# Patient Record
Sex: FEMALE | Race: WHITE | Employment: FULL TIME | URBAN - METROPOLITAN AREA
[De-identification: names, ages, dates, MRNs, and addresses within clinical notes are randomized per-mention and may not be internally consistent; named-entity substitution may affect disease eponyms.]

---

## 2017-12-13 LAB
CHLAMYDIA, EXTERNAL: NEGATIVE
HBSAG, EXTERNAL: NEGATIVE
HIV, EXTERNAL: NON REACTIVE
N. GONORRHEA, EXTERNAL: NEGATIVE
RUBELLA, EXTERNAL: NORMAL

## 2018-04-27 LAB
ANTIBODY SCREEN, EXTERNAL: NEGATIVE
GTT, 1 HR, GLUCOLA, EXTERNAL: 102
T. PALLIDUM, EXTERNAL: NEGATIVE

## 2018-06-21 LAB — GRBS, EXTERNAL: NEGATIVE

## 2018-07-02 ENCOUNTER — ANESTHESIA (OUTPATIENT)
Dept: LABOR AND DELIVERY | Age: 33
End: 2018-07-02
Payer: COMMERCIAL

## 2018-07-02 ENCOUNTER — HOSPITAL ENCOUNTER (INPATIENT)
Age: 33
LOS: 2 days | Discharge: HOME | End: 2018-07-04
Attending: OBSTETRICS & GYNECOLOGY | Admitting: OBSTETRICS & GYNECOLOGY
Payer: COMMERCIAL

## 2018-07-02 ENCOUNTER — ANESTHESIA EVENT (OUTPATIENT)
Dept: LABOR AND DELIVERY | Age: 33
End: 2018-07-02
Payer: COMMERCIAL

## 2018-07-02 DIAGNOSIS — G89.18 POST-OP PAIN: Primary | ICD-10-CM

## 2018-07-02 PROBLEM — Z34.90 PREGNANCY: Status: ACTIVE | Noted: 2018-07-02

## 2018-07-02 LAB
ALBUMIN SERPL-MCNC: 2.6 G/DL (ref 3.5–5)
ALBUMIN/GLOB SERPL: 0.9 {RATIO} (ref 1.1–2.2)
ALP SERPL-CCNC: 109 U/L (ref 45–117)
ALT SERPL-CCNC: 14 U/L (ref 12–78)
ANION GAP SERPL CALC-SCNC: 4 MMOL/L (ref 5–15)
AST SERPL-CCNC: 17 U/L (ref 15–37)
BILIRUB SERPL-MCNC: 0.2 MG/DL (ref 0.2–1)
BUN SERPL-MCNC: 8 MG/DL (ref 6–20)
BUN/CREAT SERPL: 11 (ref 12–20)
CALCIUM SERPL-MCNC: 8.5 MG/DL (ref 8.5–10.1)
CHLORIDE SERPL-SCNC: 105 MMOL/L (ref 97–108)
CO2 SERPL-SCNC: 24 MMOL/L (ref 21–32)
CREAT SERPL-MCNC: 0.7 MG/DL (ref 0.55–1.02)
CREAT UR-MCNC: 209.19 MG/DL
ERYTHROCYTE [DISTWIDTH] IN BLOOD BY AUTOMATED COUNT: 13.3 % (ref 11.5–14.5)
GLOBULIN SER CALC-MCNC: 3 G/DL (ref 2–4)
GLUCOSE SERPL-MCNC: 76 MG/DL (ref 65–100)
HCT VFR BLD AUTO: 32.2 % (ref 35–47)
HGB BLD-MCNC: 10.7 G/DL (ref 11.5–16)
LDH SERPL L TO P-CCNC: 147 U/L (ref 81–246)
MCH RBC QN AUTO: 29.8 PG (ref 26–34)
MCHC RBC AUTO-ENTMCNC: 33.2 G/DL (ref 30–36.5)
MCV RBC AUTO: 89.7 FL (ref 80–99)
NRBC # BLD: 0 K/UL (ref 0–0.01)
NRBC BLD-RTO: 0 PER 100 WBC
PLATELET # BLD AUTO: 208 K/UL (ref 150–400)
PMV BLD AUTO: 11.9 FL (ref 8.9–12.9)
POTASSIUM SERPL-SCNC: 4.2 MMOL/L (ref 3.5–5.1)
PROT SERPL-MCNC: 5.6 G/DL (ref 6.4–8.2)
PROT UR-MCNC: 50 MG/DL (ref 0–11.9)
PROT/CREAT UR-RTO: 0.2
RBC # BLD AUTO: 3.59 M/UL (ref 3.8–5.2)
SODIUM SERPL-SCNC: 133 MMOL/L (ref 136–145)
URATE SERPL-MCNC: 4.9 MG/DL (ref 2.6–6)
WBC # BLD AUTO: 8.8 K/UL (ref 3.6–11)

## 2018-07-02 PROCEDURE — 76060000078 HC EPIDURAL ANESTHESIA: Performed by: OBSTETRICS & GYNECOLOGY

## 2018-07-02 PROCEDURE — 74011250636 HC RX REV CODE- 250/636

## 2018-07-02 PROCEDURE — 82570 ASSAY OF URINE CREATININE: CPT | Performed by: OBSTETRICS & GYNECOLOGY

## 2018-07-02 PROCEDURE — 74011250636 HC RX REV CODE- 250/636: Performed by: ANESTHESIOLOGY

## 2018-07-02 PROCEDURE — 74011000258 HC RX REV CODE- 258: Performed by: OBSTETRICS & GYNECOLOGY

## 2018-07-02 PROCEDURE — 80053 COMPREHEN METABOLIC PANEL: CPT | Performed by: OBSTETRICS & GYNECOLOGY

## 2018-07-02 PROCEDURE — 59025 FETAL NON-STRESS TEST: CPT

## 2018-07-02 PROCEDURE — 83615 LACTATE (LD) (LDH) ENZYME: CPT | Performed by: OBSTETRICS & GYNECOLOGY

## 2018-07-02 PROCEDURE — 74011250636 HC RX REV CODE- 250/636: Performed by: OBSTETRICS & GYNECOLOGY

## 2018-07-02 PROCEDURE — 99284 EMERGENCY DEPT VISIT MOD MDM: CPT

## 2018-07-02 PROCEDURE — 84550 ASSAY OF BLOOD/URIC ACID: CPT | Performed by: OBSTETRICS & GYNECOLOGY

## 2018-07-02 PROCEDURE — 77030011640 HC PAD GRND REM COVD -A

## 2018-07-02 PROCEDURE — 75410000002 HC LABOR FEE PER 1 HR: Performed by: OBSTETRICS & GYNECOLOGY

## 2018-07-02 PROCEDURE — 77030034850

## 2018-07-02 PROCEDURE — 77030008459 HC STPLR SKN COOP -B

## 2018-07-02 PROCEDURE — 77030018809 HC RETRCTR ALXSO DISP AMR -B

## 2018-07-02 PROCEDURE — 74011000250 HC RX REV CODE- 250

## 2018-07-02 PROCEDURE — 85027 COMPLETE CBC AUTOMATED: CPT | Performed by: OBSTETRICS & GYNECOLOGY

## 2018-07-02 PROCEDURE — 77030018836 HC SOL IRR NACL ICUM -A

## 2018-07-02 PROCEDURE — 75410000003 HC RECOV DEL/VAG/CSECN EA 0.5 HR: Performed by: OBSTETRICS & GYNECOLOGY

## 2018-07-02 PROCEDURE — 86901 BLOOD TYPING SEROLOGIC RH(D): CPT | Performed by: OBSTETRICS & GYNECOLOGY

## 2018-07-02 PROCEDURE — 77030032490 HC SLV COMPR SCD KNE COVD -B

## 2018-07-02 PROCEDURE — 76010000391 HC C SECN FIRST 1 HR: Performed by: OBSTETRICS & GYNECOLOGY

## 2018-07-02 PROCEDURE — 36415 COLL VENOUS BLD VENIPUNCTURE: CPT | Performed by: OBSTETRICS & GYNECOLOGY

## 2018-07-02 PROCEDURE — 65270000029 HC RM PRIVATE

## 2018-07-02 RX ORDER — NALOXONE HYDROCHLORIDE 0.4 MG/ML
0.4 INJECTION, SOLUTION INTRAMUSCULAR; INTRAVENOUS; SUBCUTANEOUS AS NEEDED
Status: DISCONTINUED | OUTPATIENT
Start: 2018-07-02 | End: 2018-07-04 | Stop reason: HOSPADM

## 2018-07-02 RX ORDER — SODIUM CHLORIDE, SODIUM LACTATE, POTASSIUM CHLORIDE, CALCIUM CHLORIDE 600; 310; 30; 20 MG/100ML; MG/100ML; MG/100ML; MG/100ML
999 INJECTION, SOLUTION INTRAVENOUS CONTINUOUS
Status: DISCONTINUED | OUTPATIENT
Start: 2018-07-02 | End: 2018-07-02

## 2018-07-02 RX ORDER — ONDANSETRON 2 MG/ML
INJECTION INTRAMUSCULAR; INTRAVENOUS
Status: COMPLETED
Start: 2018-07-02 | End: 2018-07-02

## 2018-07-02 RX ORDER — FENTANYL CITRATE 50 UG/ML
INJECTION, SOLUTION INTRAMUSCULAR; INTRAVENOUS AS NEEDED
Status: DISCONTINUED | OUTPATIENT
Start: 2018-07-02 | End: 2018-07-02 | Stop reason: HOSPADM

## 2018-07-02 RX ORDER — NALOXONE HYDROCHLORIDE 0.4 MG/ML
0.2 INJECTION, SOLUTION INTRAMUSCULAR; INTRAVENOUS; SUBCUTANEOUS
Status: ACTIVE | OUTPATIENT
Start: 2018-07-02 | End: 2018-07-03

## 2018-07-02 RX ORDER — ACETAMINOPHEN 325 MG/1
650 TABLET ORAL
COMMUNITY
End: 2018-07-04

## 2018-07-02 RX ORDER — LABETALOL 100 MG/1
100 TABLET, FILM COATED ORAL
Status: DISCONTINUED | OUTPATIENT
Start: 2018-07-02 | End: 2018-07-04 | Stop reason: HOSPADM

## 2018-07-02 RX ORDER — OXYTOCIN 10 [USP'U]/ML
INJECTION, SOLUTION INTRAMUSCULAR; INTRAVENOUS AS NEEDED
Status: DISCONTINUED | OUTPATIENT
Start: 2018-07-02 | End: 2018-07-02 | Stop reason: HOSPADM

## 2018-07-02 RX ORDER — PROCHLORPERAZINE EDISYLATE 5 MG/ML
2.5 INJECTION INTRAMUSCULAR; INTRAVENOUS
Status: ACTIVE | OUTPATIENT
Start: 2018-07-02 | End: 2018-07-03

## 2018-07-02 RX ORDER — SODIUM CHLORIDE, SODIUM LACTATE, POTASSIUM CHLORIDE, CALCIUM CHLORIDE 600; 310; 30; 20 MG/100ML; MG/100ML; MG/100ML; MG/100ML
125 INJECTION, SOLUTION INTRAVENOUS CONTINUOUS
Status: DISCONTINUED | OUTPATIENT
Start: 2018-07-02 | End: 2018-07-03

## 2018-07-02 RX ORDER — OXYCODONE HYDROCHLORIDE 5 MG/1
5 TABLET ORAL
Status: ACTIVE | OUTPATIENT
Start: 2018-07-02 | End: 2018-07-03

## 2018-07-02 RX ORDER — OXYCODONE AND ACETAMINOPHEN 5; 325 MG/1; MG/1
1 TABLET ORAL
Status: DISCONTINUED | OUTPATIENT
Start: 2018-07-02 | End: 2018-07-04 | Stop reason: HOSPADM

## 2018-07-02 RX ORDER — OXYCODONE HYDROCHLORIDE 5 MG/1
10 TABLET ORAL
Status: ACTIVE | OUTPATIENT
Start: 2018-07-02 | End: 2018-07-03

## 2018-07-02 RX ORDER — ONDANSETRON 2 MG/ML
INJECTION INTRAMUSCULAR; INTRAVENOUS AS NEEDED
Status: DISCONTINUED | OUTPATIENT
Start: 2018-07-02 | End: 2018-07-02 | Stop reason: HOSPADM

## 2018-07-02 RX ORDER — SODIUM CHLORIDE 0.9 % (FLUSH) 0.9 %
5-10 SYRINGE (ML) INJECTION EVERY 8 HOURS
Status: DISCONTINUED | OUTPATIENT
Start: 2018-07-02 | End: 2018-07-03

## 2018-07-02 RX ORDER — OXYTOCIN/RINGER'S LACTATE 20/1000 ML
125-500 PLASTIC BAG, INJECTION (ML) INTRAVENOUS ONCE
Status: ACTIVE | OUTPATIENT
Start: 2018-07-02 | End: 2018-07-03

## 2018-07-02 RX ORDER — SODIUM CHLORIDE 0.9 % (FLUSH) 0.9 %
5-10 SYRINGE (ML) INJECTION EVERY 8 HOURS
Status: DISCONTINUED | OUTPATIENT
Start: 2018-07-02 | End: 2018-07-02 | Stop reason: HOSPADM

## 2018-07-02 RX ORDER — ZOLPIDEM TARTRATE 5 MG/1
5 TABLET ORAL
Status: DISCONTINUED | OUTPATIENT
Start: 2018-07-02 | End: 2018-07-04 | Stop reason: HOSPADM

## 2018-07-02 RX ORDER — ONDANSETRON 2 MG/ML
4 INJECTION INTRAMUSCULAR; INTRAVENOUS
Status: DISCONTINUED | OUTPATIENT
Start: 2018-07-02 | End: 2018-07-04 | Stop reason: HOSPADM

## 2018-07-02 RX ORDER — CEFAZOLIN SODIUM/WATER 2 G/20 ML
2 SYRINGE (ML) INTRAVENOUS ONCE
Status: COMPLETED | OUTPATIENT
Start: 2018-07-02 | End: 2018-07-02

## 2018-07-02 RX ORDER — BUPIVACAINE HYDROCHLORIDE 7.5 MG/ML
INJECTION, SOLUTION EPIDURAL; RETROBULBAR AS NEEDED
Status: DISCONTINUED | OUTPATIENT
Start: 2018-07-02 | End: 2018-07-02 | Stop reason: HOSPADM

## 2018-07-02 RX ORDER — SODIUM CHLORIDE 0.9 % (FLUSH) 0.9 %
5-10 SYRINGE (ML) INJECTION AS NEEDED
Status: DISCONTINUED | OUTPATIENT
Start: 2018-07-02 | End: 2018-07-02 | Stop reason: HOSPADM

## 2018-07-02 RX ORDER — IBUPROFEN 800 MG/1
800 TABLET ORAL EVERY 8 HOURS
Status: DISCONTINUED | OUTPATIENT
Start: 2018-07-02 | End: 2018-07-04 | Stop reason: HOSPADM

## 2018-07-02 RX ORDER — KETOROLAC TROMETHAMINE 30 MG/ML
30 INJECTION, SOLUTION INTRAMUSCULAR; INTRAVENOUS
Status: DISPENSED | OUTPATIENT
Start: 2018-07-02 | End: 2018-07-03

## 2018-07-02 RX ORDER — HYDROMORPHONE HYDROCHLORIDE 2 MG/ML
1 INJECTION, SOLUTION INTRAMUSCULAR; INTRAVENOUS; SUBCUTANEOUS ONCE
Status: COMPLETED | OUTPATIENT
Start: 2018-07-02 | End: 2018-07-02

## 2018-07-02 RX ORDER — SODIUM CHLORIDE 0.9 % (FLUSH) 0.9 %
5-10 SYRINGE (ML) INJECTION AS NEEDED
Status: DISCONTINUED | OUTPATIENT
Start: 2018-07-02 | End: 2018-07-04 | Stop reason: HOSPADM

## 2018-07-02 RX ORDER — SIMETHICONE 80 MG
80 TABLET,CHEWABLE ORAL AS NEEDED
Status: DISCONTINUED | OUTPATIENT
Start: 2018-07-02 | End: 2018-07-04 | Stop reason: HOSPADM

## 2018-07-02 RX ORDER — SODIUM CHLORIDE, SODIUM LACTATE, POTASSIUM CHLORIDE, CALCIUM CHLORIDE 600; 310; 30; 20 MG/100ML; MG/100ML; MG/100ML; MG/100ML
1000 INJECTION, SOLUTION INTRAVENOUS CONTINUOUS
Status: DISCONTINUED | OUTPATIENT
Start: 2018-07-02 | End: 2018-07-02 | Stop reason: HOSPADM

## 2018-07-02 RX ORDER — MORPHINE SULFATE 0.5 MG/ML
INJECTION, SOLUTION EPIDURAL; INTRATHECAL; INTRAVENOUS AS NEEDED
Status: DISCONTINUED | OUTPATIENT
Start: 2018-07-02 | End: 2018-07-02 | Stop reason: HOSPADM

## 2018-07-02 RX ADMIN — SODIUM CHLORIDE, SODIUM LACTATE, POTASSIUM CHLORIDE, AND CALCIUM CHLORIDE 125 ML/HR: 600; 310; 30; 20 INJECTION, SOLUTION INTRAVENOUS at 19:36

## 2018-07-02 RX ADMIN — ONDANSETRON 4 MG: 2 INJECTION INTRAMUSCULAR; INTRAVENOUS at 19:36

## 2018-07-02 RX ADMIN — OXYTOCIN 30 UNITS: 10 INJECTION, SOLUTION INTRAMUSCULAR; INTRAVENOUS at 16:45

## 2018-07-02 RX ADMIN — SODIUM CHLORIDE, SODIUM LACTATE, POTASSIUM CHLORIDE, AND CALCIUM CHLORIDE 999 ML/HR: 600; 310; 30; 20 INJECTION, SOLUTION INTRAVENOUS at 16:13

## 2018-07-02 RX ADMIN — SODIUM CHLORIDE, SODIUM LACTATE, POTASSIUM CHLORIDE, AND CALCIUM CHLORIDE 999 ML/HR: 600; 310; 30; 20 INJECTION, SOLUTION INTRAVENOUS at 15:21

## 2018-07-02 RX ADMIN — BUPIVACAINE HYDROCHLORIDE 1.6 ML: 7.5 INJECTION, SOLUTION EPIDURAL; RETROBULBAR at 16:30

## 2018-07-02 RX ADMIN — FENTANYL CITRATE 25 MCG: 50 INJECTION, SOLUTION INTRAMUSCULAR; INTRAVENOUS at 16:30

## 2018-07-02 RX ADMIN — HYDROMORPHONE HYDROCHLORIDE 0.5 MG: 2 INJECTION INTRAMUSCULAR; INTRAVENOUS; SUBCUTANEOUS at 18:47

## 2018-07-02 RX ADMIN — ONDANSETRON 4 MG: 2 INJECTION, SOLUTION INTRAMUSCULAR; INTRAVENOUS at 19:36

## 2018-07-02 RX ADMIN — MORPHINE SULFATE 200 MCG: 0.5 INJECTION, SOLUTION EPIDURAL; INTRATHECAL; INTRAVENOUS at 16:30

## 2018-07-02 RX ADMIN — PROMETHAZINE HYDROCHLORIDE 12.5 MG: 25 INJECTION INTRAMUSCULAR; INTRAVENOUS at 21:44

## 2018-07-02 RX ADMIN — SODIUM CHLORIDE, SODIUM LACTATE, POTASSIUM CHLORIDE, AND CALCIUM CHLORIDE 1000 ML: 600; 310; 30; 20 INJECTION, SOLUTION INTRAVENOUS at 15:21

## 2018-07-02 RX ADMIN — ONDANSETRON 4 MG: 2 INJECTION INTRAMUSCULAR; INTRAVENOUS at 16:45

## 2018-07-02 RX ADMIN — Medication 2 G: at 16:13

## 2018-07-02 NOTE — IP AVS SNAPSHOT
Bethanie Mayen 
 
 
 [address] [facility] 
[phone] Patient: Carlito Colindres MRN: [id number] :1985 About your hospitalization You were admitted on:  2018 You last received care in the:  [facility] You were discharged on:  2018 Why you were hospitalized Your primary diagnosis was:  Not on File Your diagnoses also included:  Pregnancy, Post-Op Pain Follow-up Information Follow up With Details Comments Contact Info Hany Mancera MD   [address] [address] [place] [address] 
[phone] Discharge Orders None A check michele indicates which time of day the medication should be taken. My Medications CHANGE how you take these medications Instructions Each Dose to Equal  
 Morning Noon Evening Bedtime  
 ibuprofen 600 mg tablet Commonly known as:  MOTRIN What changed:   
- medication strength 
- how much to take - when to take this Your last dose was: Your next dose is: Take 1 Tab by mouth every six (6) hours as needed for Pain for up to 360 days. 600 mg  
    
   
   
   
  
 oxyCODONE-acetaminophen 5-325 mg per tablet Commonly known as:  PERCOCET What changed:   
- how much to take 
- reasons to take this Your last dose was: Your next dose is: Take 1-2 Tabs by mouth every four (4) hours as needed for Pain. Max Daily Amount: 12 Tabs. 1-2 Tab CONTINUE taking these medications Instructions Each Dose to Equal  
 Morning Noon Evening Bedtime  
 [id number] jacinta-folic-dha -155 mg-mcg-mg Cmpk Your last dose was: Your next dose is: Take 1 Tab by mouth daily. Indications: PREGNANCY  
 1 Tab STOP taking these medications TYLENOL 325 mg tablet Generic drug:  acetaminophen Where to Get Your Medications Information on where to get these meds will be given to you by the nurse or doctor. ! Ask your nurse or doctor about these medications  
  ibuprofen 600 mg tablet  
 oxyCODONE-acetaminophen 5-325 mg per tablet Opioid Education Prescription Opioids: What You Need to Know: 
 
 
Diet/Diet Restrictions: 
· Eight 8-ounce glasses of fluid daily (water, juices); avoid excessive caffeine intake. · Meals/snacks as desired which are high in fiber and carbohydrates and low in fat and cholesterol. Medications:  
 
 
 
Physical Activity / Restrictions / Safety: · Avoid heavy lifting, no more that 8 lbs. For 2-3 weeks;  
· Limit use of stairs to 2 times daily for the first week home. · No driving for one week. · Avoid intercourse 4-6 weeks, no douching or tampon use. · Check with obstetrician before starting or resuming an exercise program.   
 
Discharge Instructions/Special Treatment/Home Care Needs:  
 
· Continue prenatal vitamins. · Continue to use squirt bottle with warm water on your episiotomy after each bathroom use until bleeding stops. · If steri-strips applied to your incision, remove in 7-10 days. Call your doctor for the following: · Fever over 101 degrees by mouth. · Vaginal bleeding heavier than a normal menstrual period or clots larger than a golf ball. · Red streaks or increased swelling of legs, painful red streaks on your breast. 
· Painful urination, constipation and increased pain or swelling or discharge with your incision. · If you feel extremely anxious or overwhelmed. · If you have thoughts of harming yourself and/or your baby. Pain Management:  
 
· Take Acetaminophen (Tylenol) or Ibuprofen (Advil, Motrin), as directed for pain. · Use a warm Sitz bath 3 times daily to relieve episiotomy or hemorrhoidal discomfort. · For hemorrhoidal discomfort, use Tucks and Anusol cream as needed and directed. · Heating pad to  incision as needed. Follow-Up Care:  
 
Appointment with MD: Follow-up Appointments Procedures  FOLLOW UP VISIT Appointment in: 6 Weeks With Dr Tracy Richards for postpartum check and in 2 days for BP check With Dr Tracy Richards for postpartum check and in 2 days for BP check Standing Status:   Standing Number of Occurrences:   1 Order Specific Question:   Appointment in Answer:   6 Weeks Telephone number: [phone] Signed By: Janis Cervantes MD                                                                                                   Date: 2018 Time: 9:42 AM 
 
  
  
  
Avatar Reality Announcement We are excited to announce that we are making your provider's discharge notes available to you in Avatar Reality. You will see these notes when they are completed and signed by the physician that discharged you from your recent hospital stay. If you have any questions or concerns about any information you see in Avatar Reality, please call the Health Information Department where you were seen or reach out to your Primary Care Provider for more information about your plan of care. Introducing [facility]! Keren Kaplan introduces Avatar Reality patient portal. Now you can access parts of your medical record, email your doctor's office, and request medication refills online. 1. In your internet browser, go to [email or web address] 2. Click on the First Time User? Click Here link in the Sign In box. You will see the New Member Sign Up page. 3. Enter your Sanrad Access Code exactly as it appears below. You will not need to use this code after youve completed the sign-up process. If you do not sign up before the expiration date, you must request a new code. · Sanrad Access Code: [email or web address] Expires: 10/2/2018 10:39 AM 
 
4. Enter the last four digits of your Social Security Number (xxxx) and Date of Birth (mm/dd/yyyy) as indicated and click Submit. You will be taken to the next sign-up page. 5. Create a "[employer]t ID. This will be your Sanrad login ID and cannot be changed, so think of one that is secure and easy to remember. 6. Create a Sanrad password. You can change your password at any time. 7. Enter your Password Reset Question and Answer. This can be used at a later time if you forget your password. 8. Enter your e-mail address. You will receive e-mail notification when new information is available in [address] 9. Click Sign Up. You can now view and download portions of your medical record. 10. Click the Download Summary menu link to download a portable copy of your medical information. If you have questions, please visit the Frequently Asked Questions section of the Sanrad website. Remember, Sanrad is NOT to be used for urgent needs. For medical emergencies, dial 911. Now available from your iPhone and Android! Introducing Topher Tolentino As a [facility] patient, I wanted to make you aware of our electronic visit tool called Topher Arseniomillykareem. [facility] 24/7 allows you to connect within minutes with a medical provider 24 hours a day, seven days a week via a mobile device or tablet or logging into a secure website from your computer. You can access Topher Tolentino from anywhere in the [place].  
 
A virtual visit might be right for you when you have a simple condition and feel like you just dont want to get out of bed, or cant get away from work for an appointment, when your [facility] provider is not available (evenings, weekends or holidays), or when youre out of town and need minor care. Electronic visits cost only $49 and if the [facility] 24/7 provider determines a prescription is needed to treat your condition, one can be electronically transmitted to a nearby pharmacy*. Please take a moment to enroll today if you have not already done so. The enrollment process is free and takes just a few minutes. To enroll, please download the [employer] 24/7 day to your tablet or phone, or visit [email or web address] to enroll on your computer. And, as an [address] [place] [address] patient with a [employer] account, the results of your visits will be scanned into your electronic medical record and your primary care provider will be able to view the scanned results. We urge you to continue to see your regular [facility] provider for your ongoing medical care. And while your primary care provider may not be the one available when you seek a Topher vitaMedMDmillyfin virtual visit, the peace of mind you get from getting a real diagnosis real time can be priceless. For more information on Inside, view our Frequently Asked Questions (FAQs) at [email or web address]. Sincerely, 
 
Coty Heath MD 
Chief Medical Officer [facility] Genie Carreon *:  certain medications cannot be prescribed via [employer] Providers Seen During Your Hospitalization Provider Specialty Primary office phone Silvana Mcdaniel MD Obstetrics & Gynecology [phone] Your Primary Care Physician (PCP) Primary Care Physician Office Phone Office Fax Pérez Varela [phone] You are allergic to the following No active allergies Recent Documentation Height Weight Breastfeeding? BMI OB Status Smoking Status 1.626 m 103.4 kg Unknown 39.14 kg/m2 Recent pregnancy Never Smoker Emergency Contacts Name Discharge Info Relation Home Work Mobile Hugh Pizano DISCHARGE CAREGIVER [3] Spouse [3]   [phone] Patient Belongings The following personal items are in your possession at time of discharge: 
  Dental Appliances: None  Visual Aid: With patient, Contacts      Home Medications: None   Jewelry: Earrings, Ring, With patient  Clothing: At bedside    Other Valuables: None Please provide this summary of care documentation to your next provider. Signatures-by signing, you are acknowledging that this After Visit Summary has been reviewed with you and you have received a copy. Patient Signature:  ____________________________________________________________ Date:  ____________________________________________________________  
  
Donte Reji Provider Signature:  ____________________________________________________________ Date:  ____________________________________________________________

## 2018-07-02 NOTE — ROUTINE PROCESS
1350: Patient admitted to labor and delivery for Pre-E rule out. Pt oriented to room and unit, pt changed into gown. PT placed on FHR and toco monitor, assessment completed. Plan of care discussed with pt, pt verbalizing understanding    [phone]: This RN calling Dr. Rosalie Tran to update MD on pt status and bp readings, this RN waiting for MD to return call at this time    [phone]: This RN updating Dr. Rosalie Tran over phone on pt status, bp readings, and lab values, MD calling c/s at this time and TORB start IV fluids at this time and 2 gram ancef for surgical prophylaxis. MD stating he is available for c/s when fluids are finished     1517: Dr. Rosalie Tran at pt bedside at this time discussing plan of care with pt, pt verbalizing understanding. 1522: SVE per Dr. Rosalie Tran, unchanged cervix noted FT/50/-4 noted. MD discussing plan of care with pt, pt verbalizing understanding    1525: This RN informing Dr. Raul Morales that pt has eaten peanut butter crackers today at 6, MD stating she is still fine to go back for c/s in approximately 1 hour    1615: Pt transferring from room 216 to OR 1 at this time via ambulation with this RN and pt spouse    [id number]: Pt in OR 1 at this time    1628: FHR noted in 140's and maternal heart rate noted at 85 at this time    1716: Pt transferring from OR 1 to room 216 at this time via stretcher with this RN, [facility], and pt spouse at this time    26: Pt in room 216 at this time    1719: VERA Lui leaving pt bedside at this time    1807: This RN discussing pt plan of care with Dr. Jose J Gurrola at this time. This RN updating MD on bp readings to determine plan of care for pain control. MD SANDOVAL hold off on Toradol at this time and administer dilaudid instead    1833: Dr. Josafat tee to administer 1mg dilaudid at this time for pt pain    1905: Bedside, Verbal and Written shift change report given to BUD Santamaria RN (oncoming nurse) by Nancy Wren RN (offgoing nurse).  Report included the following information SBAR, Kardex, OR Summary, Intake/Output, MAR, Accordion and Recent Results.

## 2018-07-02 NOTE — ANESTHESIA PREPROCEDURE EVALUATION
Anesthetic History   No history of anesthetic complications            Review of Systems / Medical History  Patient summary reviewed, nursing notes reviewed and pertinent labs reviewed    Pulmonary            Asthma (well controlled)        Neuro/Psych   Within defined limits           Cardiovascular    Hypertension                   GI/Hepatic/Renal  Within defined limits              Endo/Other  Within defined limits           Other Findings              Physical Exam    Airway  Mallampati: II  TM Distance: 4 - 6 cm  Neck ROM: normal range of motion   Mouth opening: Normal     Cardiovascular    Rhythm: regular  Rate: normal         Dental  No notable dental hx       Pulmonary  Breath sounds clear to auscultation               Abdominal         Other Findings            Anesthetic Plan    ASA: 2  Anesthesia type: spinal            Anesthetic plan and risks discussed with: Patient

## 2018-07-02 NOTE — H&P
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 Medications Removed from Medication List        [address] for Follow-up Visit     Estimated weeks of        gestation:  37 3     Weight:  228.4     Blood pressure: 150 / 100     Urine Protein:  Tr     Urine Glucose: N     Headache:  No     Nausea/vomiting: No     Edema:  1+gen     Vaginal bleeding: no     Vaginal discharge: no     Fetal activity:  yes     Fundal height:    39     FHR:   138/US     Labor symptoms: bhc     Fetal position:  cephalic     Next visit:  to L&D     Preceptor:  ss     Comment:  Active FM. No complaints. to L&D for r/o PreE. Fetal Surveillance  Biophysical Profile Fetus A  Breathing  2  Movement  2  Tone   2  AFV   2     ___  BPP Score  8/8    NST Fetus A  An NST was performed and was reactive. The baseline FHR was 145 and regular. Moderate variablity was present. Three accelerations of sufficient amplitude and duration were noted. There were no decelerations noted.        Impression & Recommendations:    Problem # 1:  Transient HTN in pregnancy (MILA-244.76) (YWM13-J23.3)  To L&D for r/o PreE  Pre E labs  NST  If persistently elevated (GHTN vs PreE) given term would plan for delivery and pt desires c/s if not dilated significantly at this time    Orders:  Fetal non-stress test (CPT-86232O)  Antepartum Care (CPT-10)  Patient Sent to L&D (CPT-LD)  Sent to L&D  (CPT-AdmitF)      Problem # 2: HSV-Valtrex @ 36 wks____ (FPS-149.35) (QZR98-W61.364)  Taking valtrex    Problem # 3:  Prev LTCS desires  consent 18 (ETH-434.23) (WLJ75-E15.211)      Medications (at conclusion of this visit)    2017 PNV TABS 29-1 TABLET (PRENATAL VIT-IRON CARBONYL-FA TABS)                     Review of Systems        See HPI    Except as noted in the HPI, the review of systems is negative for General, Breast, , CV, Resp, GI, Endo, MS, Derm, Neuro, Psych, Eyes, ENT, Allergy and Heme. Vital Signs    Blood Pressure: 150 / 100  NST:   reactive     Weight:  228.4 pounds      Physical Exam     General           General appearance:  no acute distress    Head           Inspection:   normal    Eyes           External:   EOM intact    ENT           Dental:   adequate dentition    Chest           Lungs:  clear to auscultation          Heart:  regular rate and rhythm    Extremeties           Extremeties:  0 edema    Neurological           Reflexes:  2+ and symmetric with no pathological reflexes    Psych           Orientation:  oriented to time, place, and person          Mood:  no appearance of anxiety, depression, or agitation    Lymph           Inguinal:  no inguinal adenopathy    Skin           Inspection:  no rashes, suspicious lesions, or ulcerations    Abdomen           Abdomen:  gravid          Fundal Height:  39    Pelvic Exam           EGBUS:  no lesions          Vagina:  normal appearing without lesions or discharge          Uterus:  gravid          Cervix:  no lesions or discharge                  Presentation:  cephalic    Allergies    This patient has no known allergies.     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____ (VZY-531.33) (EPL30-N10.754)  Taking valtrex    Problem # 3:  Prev LTCS desires  consent 18 (FRB-701.61) (HVT94-E07.211)      Medications (at conclusion of this visit)    2017 PNV TABS 29-1 TABLET (PRENATAL VIT-IRON CARBONYL-FA TABS)           LABORATORY DATA   TEST DATE RESULT   Group B Strep culture 2018 Negative                                   (Group B Strep Culture Result Field)   Blood Type 2017 A                                             (Blood Type Result Field)   Rh 2017 Positive                                   (Rh Result Field)   Rhogam Inj Given 2015 *   Tdap Vaccine Given 2018 Vacc. [address]   Antibody Screen 2018 Negative   Rubella  [facility] Reference Ranges On or After 3/10/14                  <0.90              Non-immune      0.90 - 0.99     Equivocal      >0.99              Immune    [facility] Reference Ranges  Before 3/10/14           <5                 Non-immune             5 - 9               Equivocal            >9                 Immune  [facility] Reference Ranges       < Or = 0.90       Negative             0.91-1.09          Equivocal            > Or = 1.10       Positive   2017     1.98     TPA (T Pallidum Antibodies) 2018 Negative   Serology (RPR) 2015 *   HBsAg 2017 Negative   HIV 2017 Non Reactive   Hemoglobin 2018 11.3   Hematocrit 2018 34.6   Platelets  220 X10E3/UL   TSH 10/09/2017 2.210   Urine Culture 2017 Negative   GC DNA Probe 2017 Negative   Chlamydia DNA 2017 Negative   PAP 2017 NIL   Flu Vaccine Given 2018 Vacc.  [address]   HGBA1C 2015 *   HGB Electro     T4, Free 2015 *   BG Fasting 2015 *   GTT 1H 50G 2018 102   GTT 1H 100G 2015 *   GTT 2H 100G 12/21/2015 *   GTT 3H 100G 12/21/2015 *   Glucose Plasma 12/21/2015 *   CF Accept or Decline 12/13/2017 declined   CF Screen Result 05/15/2015 Negative   Nuchal Trans 01/08/2018 1.7^1.7 mm&millimeters   AFP Only 02/05/2018 *Screen Negative*   Tetra     AFP Serum 12/21/2015 *   CVS 12/13/2017 declined   AFP Amniotic 12/21/2015 *   Amnio Karyo 12/13/2017 declined   FISH 12/21/2015 *   GC Culture 12/21/2015 *   Chlamydia Cult 12/21/2015 *   Ureaplasma     Mycoplasma     WBC 06/29/2018 8.8 X10E3/UL   RBC 06/29/2018 3.84 X10E6/UL   MCV 06/29/2018 90   MCH 06/29/2018 29.4   MCHC RBC 06/29/2018 32.7     ULTRASOUND DATA   TEST DATE RESULT   Estimated Fetal Weight 04/26/2018 0678.04105972^4200 g&grams                                     Weight % 04/26/2018 86^86% %&percent                                                MASSIEL 04/26/2018 20.14^20.1 cm&centimeters                    BPP 04/26/2018 8   Cervical Length (mm)       ]      Electronically signed by Anna Wisdom MD on 07/02/2018 at 12:45 PM    ________________________________________________________________________

## 2018-07-02 NOTE — PROGRESS NOTES
Patient seen for evaluation following admission  Blood pressures remain elevated, mostly mild with some severe ranges  Denies headache or changes in vision as well as RUQ pain  Visit Vitals    BP (!) 169/107    Pulse 80    Temp 98.4 °F (36.9 °C)    Resp 16    Ht 5' 4\" (1.626 m)    Wt 103.4 kg (228 lb)    SpO2 98%    BMI 39.14 kg/m2      Patient Vitals for the past 4 hrs: Mode Fetal Heart Rate Variability Decelerations Accelerations RN Reviewed Strip? Provider who reviewed strip?   07/02/18 1530 External 130 6-25 BPM None Yes Yes Dr. Jean Pérez   07/02/18 1522 - - - - - Yes Dr. Jean Pérez   07/02/18 1500 External 130 6-25 BPM None Yes Yes Dr. Jean Pérez   07/02/18 1430 External 130 6-25 BPM None Yes Yes Dr. Jean Pérez    Cervical Exam  Dilation (cm): 1  Eff: 50 %  Station: -3  Vaginal exam done by? : Dr. Tricia Rob Status: Intact     Recent Results (from the past 12 hour(s))   PROTEIN/CREATININE RATIO, URINE    Collection Time: 07/02/18  2:03 PM   Result Value Ref Range    Protein, urine random 50 (H) 0.0 - 11.9 mg/dL    Creatinine, urine 209.19 mg/dL    Protein/Creat.  urine Ratio 0.2     CBC W/O DIFF    Collection Time: 07/02/18  2:03 PM   Result Value Ref Range    WBC 8.8 3.6 - 11.0 K/uL    RBC 3.59 (L) 3.80 - 5.20 M/uL    HGB 10.7 (L) 11.5 - 16.0 g/dL    HCT 32.2 (L) 35.0 - 47.0 %    MCV 89.7 80.0 - 99.0 FL    MCH 29.8 26.0 - 34.0 PG    MCHC 33.2 30.0 - 36.5 g/dL    RDW 13.3 11.5 - 14.5 %    PLATELET 985 300 - 555 K/uL    MPV 11.9 8.9 - 12.9 FL    NRBC 0.0 0  WBC    ABSOLUTE NRBC 0.00 0.00 - 0.85 K/uL   METABOLIC PANEL, COMPREHENSIVE    Collection Time: 07/02/18  2:03 PM   Result Value Ref Range    Sodium 133 (L) 136 - 145 mmol/L    Potassium 4.2 3.5 - 5.1 mmol/L    Chloride 105 97 - 108 mmol/L    CO2 24 21 - 32 mmol/L    Anion gap 4 (L) 5 - 15 mmol/L    Glucose 76 65 - 100 mg/dL    BUN 8 6 - 20 MG/DL    Creatinine 0.70 0.55 - 1.02 MG/DL    BUN/Creatinine ratio 11 (L) 12 - 20      GFR est AA >60 >60 ml/min/1.73m2    GFR est non-AA >60 >60 ml/min/1.73m2    Calcium 8.5 8.5 - 10.1 MG/DL    Bilirubin, total 0.2 0.2 - 1.0 MG/DL    ALT (SGPT) 14 12 - 78 U/L    AST (SGOT) 17 15 - 37 U/L    Alk.  phosphatase 109 45 - 117 U/L    Protein, total 5.6 (L) 6.4 - 8.2 g/dL    Albumin 2.6 (L) 3.5 - 5.0 g/dL    Globulin 3.0 2.0 - 4.0 g/dL    A-G Ratio 0.9 (L) 1.1 - 2.2     LD    Collection Time: 18  2:03 PM   Result Value Ref Range     81 - 246 U/L   URIC ACID    Collection Time: 18  2:03 PM   Result Value Ref Range    Uric acid 4.9 2.6 - 6.0 MG/DL    severe GHTN at term without evidence of preE  Unfavorable cervix declining IOL  Plan for repeat  section   Consent obtained  Will move forward with RLTCS

## 2018-07-02 NOTE — ANESTHESIA POSTPROCEDURE EVALUATION
Post-Anesthesia Evaluation and Assessment    Patient: Silvana Delarosa MRN: [id number]  SSN: [id number]    YOB: 1985  Age: 33 y.o. Sex: female       Cardiovascular Function/Vital Signs  Visit Vitals    /82    Pulse 74    Temp 36.8 °C (98.3 °F)    Resp 14    Ht 5' 4\" (1.626 m)    Wt 103.4 kg (228 lb)    SpO2 99%    Breastfeeding Unknown    BMI 39.14 kg/m2       Patient is status post spinal anesthesia for Procedure(s):   SECTION. Nausea/Vomiting: None    Postoperative hydration reviewed and adequate. Pain:  Pain Scale 1: Numeric (0 - 10) (18 1800)  Pain Intensity 1: 2 (18)   Managed    Neurological Status:   Neuro (WDL): Within Defined Limits (18)   At baseline    Mental Status and Level of Consciousness: Arousable    Pulmonary Status:   O2 Device: Room air (180)   Adequate oxygenation and airway patent    Complications related to anesthesia: None    Post-anesthesia assessment completed.  No concerns    Signed By: Barbara Hughes MD     2018

## 2018-07-02 NOTE — IP AVS SNAPSHOT
[address] [facility] 104 70 [facility] 
[phone] Patient: Josef Raphael MRN: [id number] :1985 A check michele indicates which time of day the medication should be taken. My Medications CHANGE how you take these medications Instructions Each Dose to Equal  
 Morning Noon Evening Bedtime  
 ibuprofen 600 mg tablet Commonly known as:  MOTRIN What changed:   
- medication strength 
- how much to take - when to take this Your last dose was: Your next dose is: Take 1 Tab by mouth every six (6) hours as needed for Pain for up to 360 days. 600 mg  
    
   
   
   
  
 oxyCODONE-acetaminophen 5-325 mg per tablet Commonly known as:  PERCOCET What changed:   
- how much to take 
- reasons to take this Your last dose was: Your next dose is: Take 1-2 Tabs by mouth every four (4) hours as needed for Pain. Max Daily Amount: 12 Tabs. 1-2 Tab CONTINUE taking these medications Instructions Each Dose to Equal  
 Morning Noon Evening Bedtime  
 [id number] jacinta-folic-dha -220 mg-mcg-mg Cmpk Your last dose was: Your next dose is: Take 1 Tab by mouth daily. Indications: PREGNANCY  
 1 Tab STOP taking these medications TYLENOL 325 mg tablet Generic drug:  acetaminophen Where to Get Your Medications Information on where to get these meds will be given to you by the nurse or doctor. ! Ask your nurse or doctor about these medications  
  ibuprofen 600 mg tablet  
 oxyCODONE-acetaminophen 5-325 mg per tablet

## 2018-07-02 NOTE — OP NOTES
Operative Note    Name: Preethi Sosa   Medical Record Number: [id number]   YOB: 1985  Today's Date: 2018      Pre-operative Diagnosis: severe gestational hypertension at term      Previous  section    Post-operative Diagnosis: same as above      Status post repeat low transverse  section    Operation: low transverse  section Procedure(s): REPEAT LOW TRANSVERSE    SECTION    Surgeon(s):  Luis Solis MD    Anesthesia: Spinal    Prophylactic Antibiotics: Ancef    DVT Prophylaxis: Sequential Compression Devices    Fetal Description: guaman     Birth Information:   Information for the patient's :  Randell Carranza, Female [[id number]]   Delivery of a   Female [1] infant on 2018 at 4:44 PM. Apgars were 8 and 9. Umbilical Cord: 3VC    Umbilical Cord Events: none    Placenta:  removal with  appearance. Amniotic Fluid Volume: Moderate     Amniotic Fluid Description:  Clear      Placenta:  manual removal Expressed    Estimated Blood Loss (ml):  700    Specimens: none           Complications:  none    Procedure Detail:      After proper patient identification and consent, the patient was taken to the operating room, where spinal anesthesia was administered and found to be adequate. Bean catheter had been placed using sterile technique. The patient was prepped and draped in the normal sterile fashion. The abdomen was entered using the Pfannenstiel technique. The peritoneum was entered sharply well superior to the bladder without any apparent injury. The bladder flap was created. A low transverse uterine incision was made with the scalpel  and extended with blunt finger dissection. Amniotomy was performed and the fluid was medium amount clear. The babys head was then delivered atraumatically. The cord was clamped and cut and the baby was handed off to Nursing staff in attendance. Placenta was then removed from the uterus.  The uterus was curettaged with a moist lap pad and cleared of all clots and debris. The uterine incision was closed with 0 monocryl, double layer  in running locking fashion with good hemostasis assured. Good hemostasis was again reassured. The rectus muscles were reapproximated with 2-0 chromic and after re approximation of peritoneum. The fascia was closed with 0 PDS in a running fashion. Good hemostasis was assured and a small inferior fascial defect was closed with 0 monocryl in a figure of eight. The skin was closed with ensorb staples and after subcutaneous fat  Closure with 2-0 chromic. The patient tolerated the procedure well. Sponge, lap, and needle counts were correct times three and the patient and baby were taken to recovery/postpartum room in stable condition.     Ariane Harman MD  July 2, 2018  5:09 PM

## 2018-07-02 NOTE — L&D DELIVERY NOTE
Delivery Summary  Patient: Carlito Colindres             Circumcision:   NA-female  Additional Delivery Comments - see operative report      Information for the patient's :  Ashely Holguin Female [[id number]]       Labor Events:    Labor: No   Rupture Date: 2018   Rupture Time: 4:44 PM   Rupture Type AROM   Amniotic Fluid Volume: Moderate    Amniotic Fluid Description: Clear None   Induction: None       Augmentation: None   Labor Events: None     Cervical Ripening:     None     Delivery Events:  Episiotomy: None   Laceration(s):       Repaired:      Number of Repair Packets:     Suture Type and Size:       Estimated Blood Loss (ml): 700ml       Delivery Date: 2018    Delivery Time: 4:44 PM  Delivery Type: , Low Transverse  Sex:  Female     Gestational Age: 44w3d   Delivery Clinician:  Kendrick Valle  Living Status: Living   Delivery Location: L&D            APGARS  One minute Five minutes Ten minutes   Skin color: 0   1        Heart rate: 2   2        Grimace: 2   2        Muscle tone: 2   2        Breathin   2        Totals: 8   9            Presentation: Vertex    Position:   Occiput    Resuscitation Method:  Tactile Stimulation;Suctioning-bulb     Meconium Stained: None      Cord Vessels: 3 Vessels      Cord Events:    Cord Blood Sent?:  No    Blood Gases Sent?:  No    Placenta:  Date/Time:   4:45 PM  Removal: Manual Removal      Appearance: Normal     [place] Measurements:  Birth Weight:        Birth Length:        Head Circumference:        Chest Circumference:       Abdominal Girth: Other Providers:   Nirmal CARTAGENA;ALEXIA NAIK;LIN STONE;GRABIEL TAPIA;MANAS WALKER;MITCHELL DELATORRE;BHAVIK RIVERA;MARY ALMAGUER;FAM SCHAFER, Obstetrician;Primary Nurse;Primary  Nurse; Anesthesiologist;Crna;Scrub Tech;Tech;Charge Nurse;Nursery Nurse           Cord pH:  none    Episiotomy: None   Laceration(s):       Estimated Blood Loss (ml): 700    Labor Events  Method: None      Augmentation: None   Cervical Ripening:       None        Hospital Problems  Date Reviewed: 2015          Codes Class Noted POA    Pregnancy ICD-10-CM: Z34.90  ICD-9-CM: V22.2  2018 Unknown              Operative Vaginal Delivery - none    Group B Strep:   Lab Results   Component Value Date/Time    GrBStrep, External negative 2018     Information for the patient's :  Anola Abt, Female [[id number]]   No results found for: ABORH, PCTABR, PCTDIG, BILI, ABORHEXT, ABORH    No results found for: APH, APCO2, APO2, AHCO3, ABEC, ABDC, O2ST, EPHV, PCO2V, PO2V, HCO3V, EBEV, EBDV, SITE, RSCOM

## 2018-07-02 NOTE — ANESTHESIA PROCEDURE NOTES
Spinal Block    Start time: 7/2/2018 4:24 PM  End time: 7/2/2018 4:34 PM  Performed by: Joshua Nelson  Authorized by: Mary Adams     Pre-procedure:   Indications: at surgeon's request, procedure for pain and primary anesthetic  Preanesthetic Checklist: patient identified, risks and benefits discussed, anesthesia consent, site marked, patient being monitored and timeout performed    Timeout Time: 16:22          Spinal Block:   Patient Position:  Seated  Prep Region:  Lumbar  Prep: Betadine and patient draped      Location:  L2-3    Local:  Lidocaine 1%  Local Dose (mL):  3    Needle:   Needle Type:  Pencil-tip  Needle Gauge:  25 G  Attempts:  1      Events: CSF confirmed, no blood with aspiration and no paresthesia        Assessment:  Insertion:  Uncomplicated  Patient tolerance:  Patient tolerated the procedure well with no immediate complications

## 2018-07-03 LAB
BASOPHILS # BLD: 0 K/UL (ref 0–0.1)
BASOPHILS NFR BLD: 0 % (ref 0–1)
DIFFERENTIAL METHOD BLD: ABNORMAL
EOSINOPHIL # BLD: 0 K/UL (ref 0–0.4)
EOSINOPHIL NFR BLD: 0 % (ref 0–7)
ERYTHROCYTE [DISTWIDTH] IN BLOOD BY AUTOMATED COUNT: 13.3 % (ref 11.5–14.5)
HCT VFR BLD AUTO: 29.5 % (ref 35–47)
HGB BLD-MCNC: 9.5 G/DL (ref 11.5–16)
IMM GRANULOCYTES # BLD: 0.1 K/UL (ref 0–0.04)
IMM GRANULOCYTES NFR BLD AUTO: 1 % (ref 0–0.5)
LYMPHOCYTES # BLD: 1 K/UL (ref 0.8–3.5)
LYMPHOCYTES NFR BLD: 8 % (ref 12–49)
MCH RBC QN AUTO: 29.1 PG (ref 26–34)
MCHC RBC AUTO-ENTMCNC: 32.2 G/DL (ref 30–36.5)
MCV RBC AUTO: 90.2 FL (ref 80–99)
MONOCYTES # BLD: 0.8 K/UL (ref 0–1)
MONOCYTES NFR BLD: 6 % (ref 5–13)
NEUTS SEG # BLD: 11.7 K/UL (ref 1.8–8)
NEUTS SEG NFR BLD: 86 % (ref 32–75)
NRBC # BLD: 0 K/UL (ref 0–0.01)
NRBC BLD-RTO: 0 PER 100 WBC
PLATELET # BLD AUTO: 185 K/UL (ref 150–400)
PMV BLD AUTO: 11.8 FL (ref 8.9–12.9)
RBC # BLD AUTO: 3.27 M/UL (ref 3.8–5.2)
WBC # BLD AUTO: 13.6 K/UL (ref 3.6–11)

## 2018-07-03 PROCEDURE — 36415 COLL VENOUS BLD VENIPUNCTURE: CPT | Performed by: OBSTETRICS & GYNECOLOGY

## 2018-07-03 PROCEDURE — 74011250636 HC RX REV CODE- 250/636: Performed by: OBSTETRICS & GYNECOLOGY

## 2018-07-03 PROCEDURE — 85025 COMPLETE CBC W/AUTO DIFF WBC: CPT | Performed by: OBSTETRICS & GYNECOLOGY

## 2018-07-03 PROCEDURE — 65270000029 HC RM PRIVATE

## 2018-07-03 PROCEDURE — 74011250637 HC RX REV CODE- 250/637: Performed by: OBSTETRICS & GYNECOLOGY

## 2018-07-03 PROCEDURE — 74011250636 HC RX REV CODE- 250/636: Performed by: ANESTHESIOLOGY

## 2018-07-03 RX ORDER — DOCUSATE SODIUM 100 MG/1
100 CAPSULE, LIQUID FILLED ORAL DAILY
Status: DISCONTINUED | OUTPATIENT
Start: 2018-07-03 | End: 2018-07-04 | Stop reason: HOSPADM

## 2018-07-03 RX ADMIN — OXYCODONE HYDROCHLORIDE AND ACETAMINOPHEN 1 TABLET: 5; 325 TABLET ORAL at 19:14

## 2018-07-03 RX ADMIN — OXYCODONE HYDROCHLORIDE AND ACETAMINOPHEN 1 TABLET: 5; 325 TABLET ORAL at 15:26

## 2018-07-03 RX ADMIN — KETOROLAC TROMETHAMINE 30 MG: 30 INJECTION, SOLUTION INTRAMUSCULAR at 04:34

## 2018-07-03 RX ADMIN — IBUPROFEN 800 MG: 800 TABLET ORAL at 10:39

## 2018-07-03 RX ADMIN — OXYCODONE HYDROCHLORIDE AND ACETAMINOPHEN 1 TABLET: 5; 325 TABLET ORAL at 23:41

## 2018-07-03 RX ADMIN — IBUPROFEN 800 MG: 800 TABLET ORAL at 18:25

## 2018-07-03 RX ADMIN — SODIUM CHLORIDE, SODIUM LACTATE, POTASSIUM CHLORIDE, AND CALCIUM CHLORIDE 125 ML/HR: 600; 310; 30; 20 INJECTION, SOLUTION INTRAVENOUS at 02:51

## 2018-07-03 RX ADMIN — DOCUSATE SODIUM 100 MG: 100 CAPSULE, LIQUID FILLED ORAL at 13:44

## 2018-07-03 NOTE — LACTATION NOTE
This note was copied from a baby's chart. Pt will successfully establish breastfeeding by feeding in response to early feeding cues   or wake every 3h, will obtain deep latch, and will keep log of feedings/output. Taught to BF at hunger cues and or q 2-3 hrs and to offer 10-20 drops of hand expressed colostrum at any non-feeds. Breast Assessment  Left Breast: Medium, Large  Left Nipple: Everted, Intact  Right Breast: Medium, Large  Right Nipple: Everted, Intact  Breast- Feeding Assessment  Attends Breast-Feeding Classes: Yes  Breast-Feeding Experience: Yes (13 months)  Breast Trauma/Surgery: No  Type/Quality: Good  Lactation Consultant Visits  Breast-Feedings: Good   Mother/Infant Observation  Mother Observation: Alignment, Lets baby end feeding, Sleepy after feeding, Nipple round on release, Breast comfortable, Recognizes feeding cues, Holds breast  Infant Observation: Frenulum checked, Lips flanged, upper, Rhythmic suck, Opens mouth, Latches nipple and aereolae, Relaxed after feeding, Lips flanged, lower, Feeding cues  LATCH Documentation  Latch: Repeated attempts, hold nipple in mouth, stimulate to suck  Audible Swallowing: A few with stimulation  Type of Nipple: Everted (after stimulation)  Comfort (Breast/Nipple): Soft/non-tender  Hold (Positioning): No assist from staff, mother able to position/hold infant  LATCH Score: 8  Chart shows numerous feedings, void, stool WNL. Discussed importance of monitoring outputs and feedings on first week of life. Discussed ways to tell if baby is  getting enough breast milk, ie  voids and stools, change in color of stool, and return to birth wt within 2 weeks. Follow up with pediatrician visit for weight check in 1-2 days (per AAP guidelines.)  Encouraged to call Warm Line  [phone] or [facility] at [phone] for any questions/problems that arise. Mother also given breastfeeding support group dates and times for any future needsAnticipatory guidance given. Questions answered. Discussed signs of baby's allergy, excema. Discussed engorgement management, when breast are soft and flat you are making more milk than when hard and engorged. If you should have to take a medication and MD says can't breast feed contact lactation office. Breast feed if you or the baby gets sick to pass along natural immunologic protection.

## 2018-07-03 NOTE — PROGRESS NOTES
8:06 PM  Dr Annette Adkins called to notify of continued elevated bp's of 160s/90s.   Order recvd for prn labetalol if >160/>105

## 2018-07-03 NOTE — PROGRESS NOTES
Post-Operative  Day 1    Denysfatumahernesto Bai     Assessment: Post-Op day 1, stable  Severe GHTN- negative pre-eclampsia labs, had severe range BP yesterday, now currently mild range    Plan:   1. Routine post-operative care   2. Will monitor blood pressure and if does not continue to decrease will add PO agent     Information for the patient's :  Praful Infante, Female [[id number]]   , Low Transverse   Patient doing well without significant complaint. Nausea and vomiting resolved, tolerating liquids, no flatus, brooks in place. Vitals:  Visit Vitals    /84 (BP 1 Location: Left arm, BP Patient Position: At rest)    Pulse 77    Temp 98.9 °F (37.2 °C)    Resp 16    Ht 5' 4\" (1.626 m)    Wt 103.4 kg (228 lb)    SpO2 100%    Breastfeeding Unknown    BMI 39.14 kg/m2     Temp (24hrs), Av.5 °F (36.9 °C), Min:98 °F (36.7 °C), Max:98.9 °F (37.2 °C)      Last 24hr Input/Output:    Intake/Output Summary (Last 24 hours) at 18 0753  Last data filed at 18 0500   Gross per 24 hour   Intake                0 ml   Output             1975 ml   Net            -1975 ml          Exam:        Patient without distress. Lungs clear. Abdomen, bowel sounds present, soft, expected tenderness, fundus firm Wound dressing intact     Perineum normal lochia noted               Lower extremities are negative for swelling, cords or tenderness.     Labs:   Lab Results   Component Value Date/Time    WBC 13.6 (H) 2018 02:49 AM    WBC 8.8 2018 02:03 PM    WBC 17.5 (H) 2015 02:15 AM    WBC 10.3 2015 11:35 AM    HGB 9.5 (L) 2018 02:49 AM    HGB 10.7 (L) 2018 02:03 PM    HGB 11.2 (L) 2015 02:15 AM    HGB 13.3 2015 11:35 AM    HCT 29.5 (L) 2018 02:49 AM    HCT 32.2 (L) 2018 02:03 PM    HCT 33.7 (L) 2015 02:15 AM    HCT 38.5 2015 11:35 AM    PLATELET 396  02:49 AM    PLATELET 650  02:03 PM    PLATELET 601  02:15 AM    PLATELET 276 97/03/3483 11:35 AM    Hgb, External 13.1 05/15/2015    Hct, External 39.1 05/15/2015       Recent Results (from the past 24 hour(s))   PROTEIN/CREATININE RATIO, URINE    Collection Time: 07/02/18  2:03 PM   Result Value Ref Range    Protein, urine random 50 (H) 0.0 - 11.9 mg/dL    Creatinine, urine 209.19 mg/dL    Protein/Creat. urine Ratio 0.2     CBC W/O DIFF    Collection Time: 07/02/18  2:03 PM   Result Value Ref Range    WBC 8.8 3.6 - 11.0 K/uL    RBC 3.59 (L) 3.80 - 5.20 M/uL    HGB 10.7 (L) 11.5 - 16.0 g/dL    HCT 32.2 (L) 35.0 - 47.0 %    MCV 89.7 80.0 - 99.0 FL    MCH 29.8 26.0 - 34.0 PG    MCHC 33.2 30.0 - 36.5 g/dL    RDW 13.3 11.5 - 14.5 %    PLATELET 135 865 - 701 K/uL    MPV 11.9 8.9 - 12.9 FL    NRBC 0.0 0  WBC    ABSOLUTE NRBC 0.00 0.00 - 2.78 K/uL   METABOLIC PANEL, COMPREHENSIVE    Collection Time: 07/02/18  2:03 PM   Result Value Ref Range    Sodium 133 (L) 136 - 145 mmol/L    Potassium 4.2 3.5 - 5.1 mmol/L    Chloride 105 97 - 108 mmol/L    CO2 24 21 - 32 mmol/L    Anion gap 4 (L) 5 - 15 mmol/L    Glucose 76 65 - 100 mg/dL    BUN 8 6 - 20 MG/DL    Creatinine 0.70 0.55 - 1.02 MG/DL    BUN/Creatinine ratio 11 (L) 12 - 20      GFR est AA >60 >60 ml/min/1.73m2    GFR est non-AA >60 >60 ml/min/1.73m2    Calcium 8.5 8.5 - 10.1 MG/DL    Bilirubin, total 0.2 0.2 - 1.0 MG/DL    ALT (SGPT) 14 12 - 78 U/L    AST (SGOT) 17 15 - 37 U/L    Alk.  phosphatase 109 45 - 117 U/L    Protein, total 5.6 (L) 6.4 - 8.2 g/dL    Albumin 2.6 (L) 3.5 - 5.0 g/dL    Globulin 3.0 2.0 - 4.0 g/dL    A-G Ratio 0.9 (L) 1.1 - 2.2     LD    Collection Time: 07/02/18  2:03 PM   Result Value Ref Range     81 - 246 U/L   URIC ACID    Collection Time: 07/02/18  2:03 PM   Result Value Ref Range    Uric acid 4.9 2.6 - 6.0 MG/DL   TYPE & SCREEN    Collection Time: 07/02/18  2:03 PM   Result Value Ref Range    Crossmatch Expiration 07/05/2018     ABO/Rh(D) A POSITIVE     Antibody screen NEG    CBC WITH AUTOMATED DIFF    Collection Time: 07/03/18  2:49 AM   Result Value Ref Range    WBC 13.6 (H) 3.6 - 11.0 K/uL    RBC 3.27 (L) 3.80 - 5.20 M/uL    HGB 9.5 (L) 11.5 - 16.0 g/dL    HCT 29.5 (L) 35.0 - 47.0 %    MCV 90.2 80.0 - 99.0 FL    MCH 29.1 26.0 - 34.0 PG    MCHC 32.2 30.0 - 36.5 g/dL    RDW 13.3 11.5 - 14.5 %    PLATELET 888 281 - 581 K/uL    MPV 11.8 8.9 - 12.9 FL    NRBC 0.0 0  WBC    ABSOLUTE NRBC 0.00 0.00 - 0.01 K/uL    NEUTROPHILS 86 (H) 32 - 75 %    LYMPHOCYTES 8 (L) 12 - 49 %    MONOCYTES 6 5 - 13 %    EOSINOPHILS 0 0 - 7 %    BASOPHILS 0 0 - 1 %    IMMATURE GRANULOCYTES 1 (H) 0.0 - 0.5 %    ABS. NEUTROPHILS 11.7 (H) 1.8 - 8.0 K/UL    ABS. LYMPHOCYTES 1.0 0.8 - 3.5 K/UL    ABS. MONOCYTES 0.8 0.0 - 1.0 K/UL    ABS. EOSINOPHILS 0.0 0.0 - 0.4 K/UL    ABS. BASOPHILS 0.0 0.0 - 0.1 K/UL    ABS. IMM.  GRANS. 0.1 (H) 0.00 - 0.04 K/UL    DF AUTOMATED

## 2018-07-03 NOTE — LACTATION NOTE
This note was copied from a baby's chart. Discussed with mother her plan for feeding. Reviewed the benefits of exclusive breast milk feeding during the hospital stay. Informed her of the risks of using formula to supplement in the first few days of life as well as the benefits of successful breast milk feeding; referred her to the Breastfeeding booklet about this information. She acknowledges understanding of information reviewed and states that it is her plan to breastfeed her infant. Will support her choice and offer additional information as needed. Reviewed breastfeeding basics:  How milk is made and normal  breastfeeding behaviors discussed. Supply and demand,  stomach size, early feeding cues, skin to skin bonding with comfortable positioning and baby led latch-on reviewed. How to identify signs of successful breastfeeding sessions reviewed; education on assymetrical latch, signs of effective latching vs shallow, in-effective latching, normal  feeding frequency and duration and expected infant output discussed. Normal course of breastfeeding discussed including the AAP's recommendation that children receive exclusive breast milk feedings for the first six months of life with breast milk feedings to continue through the first year of life and/or beyond as complimentary table foods are added. Breastfeeding Booklet and Warm line information provided with discussion. Discussed typical  weight loss and the importance of pediatrician appointment within 24-48 hours of discharge, at 2 weeks of life and normalcy of requesting pediatric weight checks as needed in between visits. Biological Nurturing breastfeeding principles taught. How Biological Nurturing (BN)  promotes optimal breastfeeding (BF) sessions discussed. Mother encouraged to seek comfortable semi-reclining breastfeeding positions. Infant placed frontally along maternal contour.   Primitive innate feeding reflexes/behaviors of the  discussed. BN tips and techniques shared; assisted with comfortable breastfeeding positioning. Baby latching shallow. Showed mom how to obtain a deeper latch. Mom states' that feels better,\"    Pt will successfully establish breastfeeding by feeding in response to early feeding cues   or wake every 3h, will obtain deep latch, and will keep log of feedings/output. Taught to BF at hunger cues and or q 2-3 hrs and to offer 10-20 drops of hand expressed colostrum at any non-feeds.       Breast Assessment  Left Breast: Medium, Large  Left Nipple: Everted, Intact  Right Breast: Medium, Large  Right Nipple: Everted, Intact  Breast- Feeding Assessment  Attends Breast-Feeding Classes: Yes  Breast-Feeding Experience: Yes (13 months)  Breast Trauma/Surgery: No  Type/Quality: Good  Lactation Consultant Visits  Breast-Feedings: Good   Mother/Infant Observation  Mother Observation: Alignment, Breast comfortable, Nipple round on release, Recognizes feeding cues, Sleepy after feeding, Holds breast  Infant Observation: Lips flanged, lower, Latches nipple and aereolae, Rhythmic suck, Frenulum checked, Relaxed after feeding, Opens mouth, Lips flanged, upper (tight lip frenum)  LATCH Documentation  Latch: Repeated attempts, hold nipple in mouth, stimulate to suck  Audible Swallowing: A few with stimulation  Type of Nipple: Everted (after stimulation)  Comfort (Breast/Nipple): Soft/non-tender  Hold (Positioning): No assist from staff, mother able to position/hold infant  LATCH Score: 8

## 2018-07-03 NOTE — ROUTINE PROCESS
Bedside and Verbal shift change report given to Jacques Owens RN (oncoming nurse) by Manuel Smith RN (offgoing nurse). Report included the following information SBAR, Kardex and MAR.

## 2018-07-03 NOTE — PROGRESS NOTES
Bedside and Verbal shift change report given to Caren Oliver RN (oncoming nurse) by Neva Guo RN (offgoing nurse). Report included the following information SBAR, Intake/Output, MAR and Recent Results.

## 2018-07-03 NOTE — PROGRESS NOTES
Patient BP improving over course of today. Now normotensive. No medications needed at this time. Will continue to monitor.      Patient Vitals for the past 12 hrs:   Temp Pulse Resp BP   07/03/18 1324 98.4 °F (36.9 °C) 90 16 133/87   07/03/18 0914 99.4 °F (37.4 °C) (!) 102 16 (!) 127/91     Stephanie Bolaños M.D.

## 2018-07-04 VITALS
SYSTOLIC BLOOD PRESSURE: 129 MMHG | RESPIRATION RATE: 15 BRPM | HEART RATE: 86 BPM | BODY MASS INDEX: 38.93 KG/M2 | HEIGHT: 64 IN | TEMPERATURE: 98.6 F | DIASTOLIC BLOOD PRESSURE: 91 MMHG | WEIGHT: 228 LBS | OXYGEN SATURATION: 100 %

## 2018-07-04 PROBLEM — Z34.90 PREGNANCY: Status: RESOLVED | Noted: 2018-07-02 | Resolved: 2018-07-04

## 2018-07-04 PROBLEM — G89.18 POST-OP PAIN: Status: ACTIVE | Noted: 2018-07-04

## 2018-07-04 PROCEDURE — 74011250637 HC RX REV CODE- 250/637: Performed by: OBSTETRICS & GYNECOLOGY

## 2018-07-04 RX ORDER — OXYCODONE AND ACETAMINOPHEN 5; 325 MG/1; MG/1
1-2 TABLET ORAL
Qty: 30 TAB | Refills: 0 | Status: SHIPPED | OUTPATIENT
Start: 2018-07-04

## 2018-07-04 RX ORDER — IBUPROFEN 600 MG/1
600 TABLET ORAL
Qty: 30 TAB | Refills: 0 | Status: SHIPPED | OUTPATIENT
Start: 2018-07-04 | End: 2019-06-29

## 2018-07-04 RX ADMIN — OXYCODONE HYDROCHLORIDE AND ACETAMINOPHEN 1 TABLET: 5; 325 TABLET ORAL at 12:16

## 2018-07-04 RX ADMIN — IBUPROFEN 800 MG: 800 TABLET ORAL at 03:57

## 2018-07-04 RX ADMIN — OXYCODONE HYDROCHLORIDE AND ACETAMINOPHEN 1 TABLET: 5; 325 TABLET ORAL at 08:25

## 2018-07-04 RX ADMIN — DOCUSATE SODIUM 100 MG: 100 CAPSULE, LIQUID FILLED ORAL at 08:25

## 2018-07-04 RX ADMIN — IBUPROFEN 800 MG: 800 TABLET ORAL at 12:06

## 2018-07-04 RX ADMIN — SIMETHICONE CHEW TAB 80 MG 80 MG: 80 TABLET ORAL at 08:25

## 2018-07-04 RX ADMIN — OXYCODONE HYDROCHLORIDE AND ACETAMINOPHEN 1 TABLET: 5; 325 TABLET ORAL at 03:57

## 2018-07-04 NOTE — ROUTINE PROCESS
I have reviewed discharge instructions with the patient. The patient and spouse verbalized understanding. Scripts given x2

## 2018-07-04 NOTE — PROGRESS NOTES
Post-Operative  Day 2    Carolina Ayon       Assessment: Post-Op day 2, doing well and desiring discharge today, stable for discharge    Plan:   1. Discharge home today  2. Follow up in office in 6 weeks with Sánchez Richmond MD  3. Post partum activity/wound care advised, diet as tolerated  4. Discharge Medications: ibuprofen, percocet and medications prior to admission  5. Gestational HTN- normal to mildly elevated BPs with no medication and asymptomatic, BP check in 2 days in office    Information for the patient's :  Herson Mancini, Female [[id number]]   , Low Transverse   Patient doing well without significant complaint. Tolerating diet, passing flatus, voiding and ambulating without difficulty    Vitals:  Visit Vitals    BP (!) 129/91 (BP 1 Location: Left arm, BP Patient Position: At rest)    Pulse 86    Temp 98.6 °F (37 °C)    Resp 15    Ht 5' 4\" (1.626 m)    Wt 103.4 kg (228 lb)    SpO2 100%    Breastfeeding Unknown    BMI 39.14 kg/m2     Temp (24hrs), Av.6 °F (37 °C), Min:98.4 °F (36.9 °C), Max:98.8 °F (37.1 °C)        Exam:        Patient without distress. Abdomen, bowel sounds present, soft, expected tenderness, fundus firm                Wound incision clean, dry and intact               Lower extremities are negative for swelling, cords or tenderness.     Labs:   Lab Results   Component Value Date/Time    WBC 13.6 (H) 2018 02:49 AM    WBC 8.8 2018 02:03 PM    WBC 17.5 (H) 2015 02:15 AM    WBC 10.3 2015 11:35 AM    HGB 9.5 (L) 2018 02:49 AM    HGB 10.7 (L) 2018 02:03 PM    HGB 11.2 (L) 2015 02:15 AM    HGB 13.3 2015 11:35 AM    HCT 29.5 (L) 2018 02:49 AM    HCT 32.2 (L) 2018 02:03 PM    HCT 33.7 (L) 2015 02:15 AM    HCT 38.5 2015 11:35 AM    PLATELET 050  02:49 AM    PLATELET 134  02:03 PM    PLATELET 410  02:15 AM    PLATELET 759 15/09/1988 11:35 AM    Hgb, External 13.1 05/15/2015    Hct, External 39.1 05/15/2015       No results found for this or any previous visit (from the past 24 hour(s)).

## 2018-07-04 NOTE — DISCHARGE INSTRUCTIONS
POST DELIVERY DISCHARGE INSTRUCTIONS    Name: Silvana Delarosa  YOB: 1985  Primary Diagnosis: Active Problems:    Post-op pain (2018)        General:     Diet/Diet Restrictions:  · Eight 8-ounce glasses of fluid daily (water, juices); avoid excessive caffeine intake. · Meals/snacks as desired which are high in fiber and carbohydrates and low in fat and cholesterol. Medications:         Physical Activity / Restrictions / Safety:     · Avoid heavy lifting, no more that 8 lbs. For 2-3 weeks;   · Limit use of stairs to 2 times daily for the first week home. · No driving for one week. · Avoid intercourse 4-6 weeks, no douching or tampon use. · Check with obstetrician before starting or resuming an exercise program.      Discharge Instructions/Special Treatment/Home Care Needs:     · Continue prenatal vitamins. · Continue to use squirt bottle with warm water on your episiotomy after each bathroom use until bleeding stops. · If steri-strips applied to your incision, remove in 7-10 days. Call your doctor for the following:     · Fever over 101 degrees by mouth. · Vaginal bleeding heavier than a normal menstrual period or clots larger than a golf ball. · Red streaks or increased swelling of legs, painful red streaks on your breast.  · Painful urination, constipation and increased pain or swelling or discharge with your incision. · If you feel extremely anxious or overwhelmed. · If you have thoughts of harming yourself and/or your baby. Pain Management:     · Take Acetaminophen (Tylenol) or Ibuprofen (Advil, Motrin), as directed for pain. · Use a warm Sitz bath 3 times daily to relieve episiotomy or hemorrhoidal discomfort. · For hemorrhoidal discomfort, use Tucks and Anusol cream as needed and directed. · Heating pad to  incision as needed.      Follow-Up Care:     Appointment with MD:   Follow-up Appointments   Procedures    FOLLOW UP VISIT Appointment in: 6 Weeks With  Renny for postpartum check and in 2 days for BP check     With Dr Sobia Lacey for postpartum check and in 2 days for BP check     Standing Status:   Standing     Number of Occurrences:   1     Order Specific Question:   Appointment in     Answer:   Jian Serrano     Telephone number: [phone]    Signed By: Randy Zaidi MD                                                                                                   Date: 7/4/2018 Time: 9:42 AM

## 2018-07-04 NOTE — ROUTINE PROCESS
Bedside and Verbal shift change report given to Ki Del Rio RN (oncoming nurse) by Chelsy Nicholas RN (offgoing nurse). Report included the following information SBAR, Kardex and MAR.

## 2018-07-04 NOTE — LACTATION NOTE
This note was copied from a baby's chart. Pt will successfully establish breastfeeding by feeding in response to early feeding cues   or wake every 3h, will obtain deep latch, and will keep log of feedings/output. Taught to BF at hunger cues and or q 2-3 hrs and to offer 10-20 drops of hand expressed colostrum at any non-feeds. Breast Assessment  Left Breast: Medium, Large  Left Nipple: Intact, Everted  Right Breast: Medium  Right Nipple:  Intact, Everted  Breast- Feeding Assessment  Attends Breast-Feeding Classes: Yes  Breast-Feeding Experience: Yes (13 months)  Breast Trauma/Surgery: No  Type/Quality: Good  Lactation Consultant Visits  Breast-Feedings: Good   Mother/Infant Observation  Mother Observation: Alignment, Recognizes feeding cues, Nipple round on release, Lets baby end feeding  Infant Observation: Opens mouth, Lips flanged, upper, Lips flanged, lower, Latches nipple and aereolae, Rhythmic suck, Relaxed after feeding, Audible swallows  LATCH Documentation  Latch: Grasps breast, tongue down, lips flanged, rhythmic sucking  Audible Swallowing: Spontaneous and intermittent (24 hours old)  Type of Nipple: Everted (after stimulation)  Comfort (Breast/Nipple): Soft/non-tender  Hold (Positioning): No assist from staff, mother able to position/hold infant  LATCH Score: 10  Dc teaching done yesterday

## 2018-07-04 NOTE — LACTATION NOTE
This note was copied from a baby's chart. Instructed Mom to call [facility] when she gest ready to feed.

## 2018-07-04 NOTE — DISCHARGE SUMMARY
Obstetrical Discharge Summary     Name: Anju Velazquez MRN: [id number]  SSN: [id number]    YOB: 1985  Age: 33 y.o. Sex: female      Admit Date: 2018    Discharge Date: 2018     Admitting Physician: Bj Camejo MD     Attending Physician:  Peter Burton MD     Admission Diagnoses: Pre-eclampsia in third trimester [O14.93]    Discharge Diagnoses:   Information for the patient's :  Natacha Gimenez Female [[id number]]   Delivery of a 3.555 kg female infant via , Low Transverse on 2018 at 4:44 PM  by . Apgars were 8 and 9. Additional Diagnoses:   Hospital Problems  Date Reviewed: 2015          Codes Class Noted POA    Post-op pain ICD-10-CM: G89.18  ICD-9-CM: 338.18  2018 Unknown             Lab Results   Component Value Date/Time    Rubella, External immune 2017    GrBStrep, External negative 2018       Hospital Course: Normal hospital course following the delivery. Disposition at Discharge: Home or self care    Discharged Condition: Stable    Patient Instructions:   Current Discharge Medication List      CONTINUE these medications which have CHANGED    Details   ibuprofen (MOTRIN) 600 mg tablet Take 1 Tab by mouth every six (6) hours as needed for Pain for up to 360 days. Qty: 30 Tab, Refills: 0      oxyCODONE-acetaminophen (PERCOCET) 5-325 mg per tablet Take 1-2 Tabs by mouth every four (4) hours as needed for Pain. Max Daily Amount: 12 Tabs. Qty: 30 Tab, Refills: 0    Associated Diagnoses: Post-op pain         CONTINUE these medications which have NOT CHANGED    Details   PNV no.24-iron-folic acid-dha -903 mg-mcg-mg cmpk Take 1 Tab by mouth daily. Indications: PREGNANCY         STOP taking these medications       acetaminophen (TYLENOL) 325 mg tablet Comments:   Reason for Stopping:               Reference my discharge instructions.     Follow-up Appointments   Procedures    FOLLOW UP VISIT Appointment in: 6 Weeks With Dr Constantine Choi for postpartum check and in 2 days for BP check     With Dr Racquel Hines for postpartum check and in 2 days for BP check     Standing Status:   Standing     Number of Occurrences:   1     Order Specific Question:   Appointment in     Answer:   6 Weeks        Signed By:  Jackie Izaguirre MD     July 4, 2018

## 2018-07-15 LAB
ABO + RH BLD: NORMAL
BLOOD GROUP ANTIBODIES SERPL: NORMAL
SPECIMEN EXP DATE BLD: NORMAL

## (undated) DEVICE — BULB SYRINGE, IRRIGATION WITH PROTECTIVE CAP, 60 CC, INDIVIDUALLY WRAPPED: Brand: DOVER

## (undated) DEVICE — STERILE POLYISOPRENE POWDER-FREE SURGICAL GLOVES: Brand: PROTEXIS

## (undated) DEVICE — MASTISOL ADHESIVE LIQ 2/3ML

## (undated) DEVICE — TIP CLEANER: Brand: VALLEYLAB

## (undated) DEVICE — (D)STRIP SKN CLSR 0.5X4IN WHT --

## (undated) DEVICE — ROCKER SWITCH PENCIL HOLSTER: Brand: VALLEYLAB

## (undated) DEVICE — HANDLE LT SNAP ON ULT DURABLE LENS FOR TRUMPF ALC DISPOSABLE

## (undated) DEVICE — BLADE ASSEMB CLP HAIR FINE --

## (undated) DEVICE — SOLIDIFIER FLUID 3000 CC ABSORB

## (undated) DEVICE — (D)PREP SKN CHLRAPRP APPL 26ML -- CONVERT TO ITEM 371833

## (undated) DEVICE — TRAY CATH OD16FR SIL URIN M STATLOK STBL DEV SURSTP

## (undated) DEVICE — REM POLYHESIVE ADULT PATIENT RETURN ELECTRODE: Brand: VALLEYLAB

## (undated) DEVICE — LARGE, DISPOSABLE ALEXIS O C-SECTION PROTECTOR - RETRACTOR: Brand: ALEXIS ® O C-SECTION PROTECTOR - RETRACTOR

## (undated) DEVICE — KENDALL SCD EXPRESS SLEEVES, KNEE LENGTH, MEDIUM: Brand: KENDALL SCD

## (undated) DEVICE — 3000CC GUARDIAN II: Brand: GUARDIAN

## (undated) DEVICE — C-SECTION II-LF: Brand: MEDLINE INDUSTRIES, INC.

## (undated) DEVICE — TOWEL,OR,DSP,ST,BLUE,STD,2/PK,40PK/CS: Brand: MEDLINE

## (undated) DEVICE — SUTURE 2-0 L36IN ABSRB BRN CT L40MM 1/2 CIR TAPERPOINT SGL 913H

## (undated) DEVICE — SUTURE MCRYL SZ 0 L36IN ABSRB VLT L48MM CTX 1/2 CIR Y398H

## (undated) DEVICE — ABDOMINAL PAD: Brand: DERMACEA

## (undated) DEVICE — SOLUTION IV 1000ML 0.9% SOD CHL

## (undated) DEVICE — SUTURE PDS II SZ 0 L60IN ABSRB VLT L48MM CTX 1/2 CIR Z990G

## (undated) DEVICE — SUTURE MCRYL SZ 4-0 L27IN ABSRB UD L19MM PS-2 1/2 CIR PRIM Y426H

## (undated) DEVICE — STAPLER SKIN SQ 30 ABSRB STPL DISP INSORB

## (undated) DEVICE — GOWN,AURORA,FABRIC-REINFORCED,X-LARGE: Brand: MEDLINE